# Patient Record
Sex: MALE | Race: WHITE | Employment: OTHER | URBAN - METROPOLITAN AREA
[De-identification: names, ages, dates, MRNs, and addresses within clinical notes are randomized per-mention and may not be internally consistent; named-entity substitution may affect disease eponyms.]

---

## 2021-06-02 ENCOUNTER — OFFICE VISIT (OUTPATIENT)
Dept: PSYCHIATRY | Facility: CLINIC | Age: 71
End: 2021-06-02

## 2021-06-02 DIAGNOSIS — G25.81 RESTLESS LEGS SYNDROME: ICD-10-CM

## 2021-06-02 DIAGNOSIS — M54.42 BILATERAL LOW BACK PAIN WITH LEFT-SIDED SCIATICA: Primary | ICD-10-CM

## 2021-06-02 NOTE — PROGRESS NOTES
Mr. Griffith, is a 71 year old male is here today for initial acupuncture treatment for main complaint of Back Pain     Here today with complaints of low back pain. Self-referred. Tried acupuncture once about 50 years back for shoulder pain caused by work as a , and it was helpful, so thought it would be good to try, given the insurance coverage and the fact that he's trying to reduce intake of pain medications.     Quality of pain: dull, achy. At times radiates down left leg.  Timing: constant. Manages with daily stretching/yoga in the morning x 45 minutes. While it doesn't completely reduce the pain, if he doesn't do this, he will notice the pain is much worse.     Pain medications used:  Aleve - 2 tabs, every day x 3-5 years. Somewhat helpful, but not completely. Doctor didn't want him to continue taking, so was supportive of his trial of CBD tincture, which has also proved to be helpful.     CBD Tincture - helpful. Wishes  It did more at current dose (70mg/day), but is hesitant to increase dose due to cost. Has start augmenting this with daily Tylenol, which his doctor recommended.       Patient is referred by: Self      Secondary Complaints: Restless less syndrome, essential tremor. Is told tremor is familial. Initially noted in right hand only, now seeing mild tremor in left hand.     Observation: Pulse: tight  Tongue: dusky pink with slight dry white coat, Mild sore noted on right side of tongue.   General: Well appearing, well nourished, in no distress. Oriented X3, intact recent and remote memory, judgment and insight, normal mood and affect.   Skin: Good turgor, no rash, unusual bruising or prominent lesions  Nails: Normal color, no deformities  Head: Normocephalic, atraumatic, no visible or palpable masses, depressions, or scarring. No hair noted.   Extremities: No amputations or deformities  Musculoskeletal: Normal gait and station. Ambulating without difficulty.     Eastern Medical Diagnosis  Pertinent to Treatment:  Qi Stagnation, Blood Stagnation, Kidney Qi Deficiency, Liver Qi Stagnation and Damp and Phlegm Accumulation      Treatment Principle: Unblock channels and move qi and blood to reduce back pain. Nourish kidney qi to support strength in channels and back flexibility.    Points Used Today:    Initial Point Insertions: GV20, Front zone Dingjie x 2  Number of needles inserted: 3     Additional Point Insertions: Ear: shenmen, LI4, TB5 (right), SI3 (left), BL62 (right), GB41 (left).  Number of needles inserted: 8    Needles stimulated,retained and removed.    Accessory Technique's:   TDP Heat Lamp: On Feet   Essential Oil(s): None    Acupuncture Treatment Recommendations and Comments: Weekly acupuncture for 4-8 weeks to assess for benefit for low back pain.        Past Medical History:   has no past medical history on file.      Energy: Medium  Sleep: restless legs  Emotions: No  Limbs/Back: Refer to Notes  Head/Eyes/Ears: NA  Digestion: no current symptoms  Stools: No Current Symptoms  Palpitation: NA  Women's Health: NA      Plan: It is my recommendation that patient seek advice from their PCP about active symptoms not addressed during this visit. Addressed all patient's questions to their satisfaction and was given consent to treat.      Patient understands that acupuncture is not a guarantee of benefits and will verify with own insurance and/or nav specialists. The risks and benefits of acupuncture were reviewed and the patient stated understanding. Answered all patient's questions to their satisfaction. Scope of practice and the acupuncturist's qualifications were also reviewed the patient provided signed consent.       Time Spent with Patient:       I spent a total of 60 minutes face-to-face with Dada Griffith during today's office visit. Total time from needle insertion to removal was 40 minutes.  Over 50% of this time was spent counseling the patient and/or coordinating care  regarding   Chief Complaint   Patient presents with     Back Pain    .  See note for details.    Cornelius Davis ND, LAc

## 2021-06-08 ENCOUNTER — OFFICE VISIT (OUTPATIENT)
Dept: BEHAVIORAL HEALTH | Facility: CLINIC | Age: 71
End: 2021-06-08

## 2021-06-08 DIAGNOSIS — M54.42 BILATERAL LOW BACK PAIN WITH LEFT-SIDED SCIATICA: Primary | ICD-10-CM

## 2021-06-09 ENCOUNTER — TELEPHONE (OUTPATIENT)
Dept: BEHAVIORAL HEALTH | Facility: CLINIC | Age: 71
End: 2021-06-09

## 2021-06-09 NOTE — TELEPHONE ENCOUNTER
LVM-informed patient that the only date and time available for acupuncture at [facility] was on July 6th at 9:30 am with Cornelius Davis. I also informed the patient to call us back if he was interested in being seen here.

## 2021-06-09 NOTE — PROGRESS NOTES
Mr. Griffith, is a 71 year old male is here today for a Follow Up acupuncture treatment for main complaint of Back Pain       Patient is referred by: Self    Report of Findings from Previous Treatment: Initial treatment felt very relaxing and calming. Noted some reduction in pain/discomfort for a few days after treatment.        Observation: Pulse: tight  Tongue: pale pink with slight white coat. Slightly puffy with slight scallops.  General: Well appearing, well nourished, in no distress. Oriented X3, intact recent and remote memory, judgment and insight, normal mood and affect.   Skin: Good turgor, no rash, unusual bruising or prominent lesions  Hair: Normal texture and distribution.  Nails: Normal color, no deformities  Head: Normocephalic, atraumatic.  Extremities: No amputations or deformities  Musculoskeletal: Normal gait and station. Ambulating without difficulty.       Eastern Medical Diagnosis Pertinent to Treatment:  Qi Deficiency, Qi Stagnation, Blood Stagnation and Kidney Qi Deficiency    Treatment Principle: Unblock channels and move qi and blood to reduce back pain. Nourish kidney qi to strengthen channel - back, legs and ankles.     Points Used Today:    Initial Point Insertions: GV20, Front zone Dingjie x 2  Number of needles inserted: 3     Additional Point Insertions: BL40, KD3, BL57, BL62, SI3 (right)  Number of needles inserted: 9     Additional Point Insertions: BL13, BL14, BL15, BL17, BL20, BL22, BL23, BL25, BL26, GB21  Number of needles inserted: 20      Needles stimulated,retained and removed.  Total number of needles removed and placed in sharps container: 32    Accessory Technique's:   TDP Heat Lamp: On Back   Essential Oil(s): None    Acupuncture Treatment Recommendations and Comments: Continue weekly acupuncture for 12 sessions and re-evaluate for benefit to manage low back pain.       Past Medical History:   has no past medical history on file.      Energy: Medium  Sleep: No  concerns  Emotions: No  Limbs/Back: Refer to Notes  Head/Eyes/Ears: NA  Digestion: no current symptoms  Stools: No Current Symptoms  Palpitation: NA  Women's Health: NA    Plan: It is my recommendation that patient seek advice from their PCP about active symptoms not addressed during this visit. Addressed all patient's questions to their satisfaction and was given consent to treat.      Patient understands that acupuncture is not a guarantee of benefits and will verify with own insurance and/or nav specialists. The risks and benefits of acupuncture were reviewed and the patient stated understanding. Answered all patient's questions to their satisfaction. Scope of practice and the acupuncturist's qualifications were also reviewed the patient provided signed consent.       Time Spent with Patient:        I spent a total of 60 minutes face-to-face with Dada Griffith during today's office visit. Total time from needle insertion to removal was 40 minutes. Over 50% of this time was spent counseling the patient and/or coordinating care regarding   Chief Complaint   Patient presents with     Back Pain   .  See note for details.            Cornelius Davis ND, LAc

## 2021-07-06 ENCOUNTER — OFFICE VISIT (OUTPATIENT)
Dept: BEHAVIORAL HEALTH | Facility: CLINIC | Age: 71
End: 2021-07-06

## 2021-07-06 DIAGNOSIS — G89.29 CHRONIC BILATERAL LOW BACK PAIN WITH LEFT-SIDED SCIATICA: Primary | ICD-10-CM

## 2021-07-06 DIAGNOSIS — M54.42 CHRONIC BILATERAL LOW BACK PAIN WITH LEFT-SIDED SCIATICA: Primary | ICD-10-CM

## 2021-07-06 NOTE — PROGRESS NOTES
"Mr. Griffith, is a 71 year old male is here today for a Follow Up acupuncture treatment for main complaint of Back Pain       Patient is referred by: Self    Report of Findings from Previous Treatment: Has been a few weeks since last appointment. Could \"feel\" that hadn't had an appointment. Does a lot to care for his body and ensure it feels good (daily stretching, walking, etc), but the acupuncture ha provided an added benefit for pain relief.    Observation:   General: Well appearing, well nourished, in no distress. Oriented X3, intact recent and remote memory, judgment and insight, normal mood and affect.   Skin: Good turgor, no rash, unusual bruising or prominent lesions  Hair: Normal texture and distribution.  Nails: Normal color, no deformities  Head: Normocephalic, atraumatic, no visible or palpable masses, depressions, or scarring.   Extremities: No amputations.  Musculoskeletal: Normal gait and station. Ambulating without difficulty.      Eastern Medical Diagnosis Pertinent to Treatment:  Qi Stagnation, Blood Stagnation and Kidney Qi Deficiency    Treatment Principle: Unblock channels and move qi and blood to reduce low back pain.     Points Used Today:    Initial Point Insertions: GV20, Front zone Dingjie x 2, GV24, ST8 (motor-neuron zone)  Number of needles inserted: 6     Additional Point Insertions: 3NP each ear  Number of needles inserted: 6     Additional Point Insertions: SI3 (right), BL62 (left),  GB39, KD3, LR3, GB34  Number of needles inserted: 8    Needles stimulated,retained and removed.  Total number of needles removed and placed in sharps container: 20    Accessory Technique's:   TDP Heat Lamp: On Feet   Essential Oil(s): None    Acupuncture Treatment Recommendations and Comments: Continue weekly acupuncture to manage low back pain.        Past Medical History:   has no past medical history on file.      Energy: Medium  Sleep: No concerns  Emotions: No  Limbs/Back: Refer to " Notes  Head/Eyes/Ears: NA  Digestion: no current symptoms  Stools: No Current Symptoms  Palpitation: NA  Women's Health: NA    Plan: It is my recommendation that patient seek advice from their PCP about active symptoms not addressed during this visit. Addressed all patient's questions to their satisfaction and was given consent to treat.      Patient understands that acupuncture is not a guarantee of benefits and will verify with own insurance and/or nav specialists. The risks and benefits of acupuncture were reviewed and the patient stated understanding. Answered all patient's questions to their satisfaction. Scope of practice and the acupuncturist's qualifications were also reviewed the patient provided signed consent.       Time Spent with Patient:        I spent a total of 60 minutes face-to-face with Dada Griffith during today's office visit. Total time from needle insertion to removal was 40 minutes. Over 50% of this time was spent counseling the patient and/or coordinating care regarding   Chief Complaint   Patient presents with     Back Pain   .  See note for details.            Cornelius Davis ND, LAc

## 2021-07-19 ENCOUNTER — OFFICE VISIT (OUTPATIENT)
Dept: PSYCHIATRY | Facility: CLINIC | Age: 71
End: 2021-07-19

## 2021-07-19 DIAGNOSIS — M54.42 CHRONIC BILATERAL LOW BACK PAIN WITH LEFT-SIDED SCIATICA: Primary | ICD-10-CM

## 2021-07-19 DIAGNOSIS — R25.1 TREMOR: ICD-10-CM

## 2021-07-19 DIAGNOSIS — G89.29 CHRONIC BILATERAL LOW BACK PAIN WITH LEFT-SIDED SCIATICA: Primary | ICD-10-CM

## 2021-07-19 DIAGNOSIS — G25.81 RESTLESS LEGS SYNDROME: ICD-10-CM

## 2021-07-19 NOTE — PROGRESS NOTES
Mr. Griffith, is a 71 year old male is here today for a Follow Up acupuncture treatment for main complaint of Back Pain and Tremors       Patient is referred by: Self    Report of Findings from Previous Treatment: Doing well - has been keeping busy with various engagements and family gatherings.  Plans to vacation up [place] ([facility]) for a week after next visit.     Finds he notices decreased low back pain and decrease in tremors for about 3 days after acupuncture    Observation: General: Well appearing, well nourished, in no distress. Oriented X3, intact recent and remote memory, judgment and insight, normal mood and affect.   Skin: Good turgor, no rash, unusual bruising or prominent lesions  Hair: Normal texture and distribution.  Nails: Normal color, no deformities  Head: Normocephalic, atraumatic, no visible or palpable masses, depressions, or scarring.   Extremities: No amputations. Notable scars on right and left knees  Musculoskeletal: Normal gait and station. Ambulating without difficulty      Eastern Medical Diagnosis Pertinent to Treatment:  Qi Stagnation, Blood Stagnation and Kidney Qi Deficiency    Treatment Principle: Unblock channels and move qi and blood to reduce low back pain.     Points Used Today:    Initial Point Insertions: GV20, Front zone Dingjie x 2, GV24, ST8 (motor-neuron zone)  Number of needles inserted: 6                 Additional Point Insertions: 3NP each ear  Number of needles inserted: 6                 Additional Point Insertions: SI3 (right), BL62 (left), GB39, KD3, LR3, GB34  Number of needles inserted: 8     Needles stimulated,retained and removed.  Total number of needles removed and placed in sharps container: 20    Accessory Technique's:   TDP Heat Lamp: On Feet   Essential Oil(s): None    Acupuncture Treatment Recommendations and Comments: Continue weekly acupuncture to manage low back pain and tremors.       Past Medical History:   has no past medical history on  file.      Energy: Medium  Sleep: No concerns  Emotions: No  Limbs/Back: Refer to Notes  Head/Eyes/Ears: NA  Digestion: no current symptoms  Stools: No Current Symptoms  Palpitation: NA    Plan: It is my recommendation that patient seek advice from their PCP about active symptoms not addressed during this visit. Addressed all patient's questions to their satisfaction and was given consent to treat.      Patient understands that acupuncture is not a guarantee of benefits and will verify with own insurance and/or nav specialists. The risks and benefits of acupuncture were reviewed and the patient stated understanding. Answered all patient's questions to their satisfaction. Scope of practice and the acupuncturist's qualifications were also reviewed the patient provided signed consent.       Time Spent with Patient:        I spent a total of 60 minutes face-to-face with Dada Griffith during today's office visit. Total time from needle insertion to removal was 40 minutes. Over 50% of this time was spent counseling the patient and/or coordinating care regarding   Chief Complaint   Patient presents with     Back Pain     Tremors   .  See note for details.            Cornelius Davis ND, LAc

## 2021-07-26 ENCOUNTER — OFFICE VISIT (OUTPATIENT)
Dept: PSYCHIATRY | Facility: CLINIC | Age: 71
End: 2021-07-26

## 2021-07-26 DIAGNOSIS — G25.81 RESTLESS LEGS SYNDROME: ICD-10-CM

## 2021-07-26 DIAGNOSIS — M54.42 CHRONIC BILATERAL LOW BACK PAIN WITH LEFT-SIDED SCIATICA: Primary | ICD-10-CM

## 2021-07-26 DIAGNOSIS — R25.1 TREMOR: ICD-10-CM

## 2021-07-26 DIAGNOSIS — G89.29 CHRONIC BILATERAL LOW BACK PAIN WITH LEFT-SIDED SCIATICA: Primary | ICD-10-CM

## 2021-07-26 NOTE — PROGRESS NOTES
Mr. Griffith, is a 71 year old male is here today for a Follow Up acupuncture treatment for main complaint of Back Pain       Patient is referred by: Self    Report of Findings from Previous Treatment: Low back pain - denies flare of acute pain. Participating in ADLs and social gatherings with family without difficulty.     Secondary Complaints: Tremor - notices improvement (decrease) in tremor after acupuncture sessions. This lasts about 3 days.     Observation:   Pulse: soft and muffled.  General: Well appearing, well nourished, in no distress. Oriented X3, intact recent and remote memory, judgment and insight, normal mood and affect.   Skin: Good turgor, no rash, unusual bruising or prominent lesions  Hair: Normal texture and distribution.  Nails: Normal color, no deformities  Head: Normocephalic, atraumatic, no visible or palpable masses, depressions, or scarring.   Extremities: No amputations. Notable scars on right and left knees  Musculoskeletal: Normal gait and station. Ambulating without difficulty      Eastern Medical Diagnosis Pertinent to Treatment:  Qi Stagnation, Blood Stagnation and Kidney Qi Deficiency    Treatment Principle: Unblock channels and move qi and blood to reduce low back pain.     Points Used Today:               Initial Point Insertions: GV20, Front zone Dingjie x 2, GV24, ST8 (motor-neuron zone)  Number of needles inserted: 6                 Additional Point Insertions: 3NP each ear  Number of needles inserted: 6                 Additional Point Insertions: SI3 (right), BL62 (left), GB34, KD3, LR2, LR8. LI4 (left)  Number of needles inserted: 11     Needles stimulated,retained and removed.  Total number of needles removed and placed in sharps container: 25    Accessory Technique's:   TDP Heat Lamp: On Feet   Essential Oil(s): None    Acupuncture Treatment Recommendations and Comments: Continue weekly acupuncture to manage low back pain and tremors.       Past Medical History:   has no  past medical history on file.      Energy: Medium  Sleep: No concerns  Emotions: No  Limbs/Back: Refer to Notes  Head/Eyes/Ears: NA  Digestion: no current symptoms  Stools: No Current Symptoms  Palpitation: NA  Women's Health: NA    Plan: It is my recommendation that patient seek advice from their PCP about active symptoms not addressed during this visit. Addressed all patient's questions to their satisfaction and was given consent to treat.      Patient understands that acupuncture is not a guarantee of benefits and will verify with own insurance and/or nav specialists. The risks and benefits of acupuncture were reviewed and the patient stated understanding. Answered all patient's questions to their satisfaction. Scope of practice and the acupuncturist's qualifications were also reviewed the patient provided signed consent.       Time Spent with Patient:        I spent a total of 45 minutes face-to-face with Dada Griffith during today's office visit. Total time from needle insertion to removal was 35 minutes. Over 50% of this time was spent counseling the patient and/or coordinating care regarding   Chief Complaint   Patient presents with     Back Pain   .  See note for details.            Cornelius Davis ND, LAc

## 2021-08-09 ENCOUNTER — OFFICE VISIT (OUTPATIENT)
Dept: PSYCHIATRY | Facility: CLINIC | Age: 71
End: 2021-08-09

## 2021-08-09 DIAGNOSIS — R25.1 TREMOR: ICD-10-CM

## 2021-08-09 DIAGNOSIS — G89.29 CHRONIC BILATERAL LOW BACK PAIN WITH LEFT-SIDED SCIATICA: Primary | ICD-10-CM

## 2021-08-09 DIAGNOSIS — M54.42 CHRONIC BILATERAL LOW BACK PAIN WITH LEFT-SIDED SCIATICA: Primary | ICD-10-CM

## 2021-08-09 DIAGNOSIS — G25.81 RESTLESS LEGS SYNDROME: ICD-10-CM

## 2021-08-09 NOTE — PROGRESS NOTES
Mr. Griffith, is a 71 year old male is here today for a Follow Up acupuncture treatment for main complaint of Back Pain and Tremors       Patient is referred by: Self    Report of Findings from Previous Treatment: Low back pain - denies flare of acute pain. Participating in ADLs and social gatherings with family without difficulty.  [place]stack wood and enjoy time with reduced pain/stiffness in low back and other joints. Not sure if it's related to normal temps (since it was not too hot, wasn't in AC, and body tends to do better with warmth).      Secondary Complaints: Tremor - notices improvement (decrease) in tremor after acupuncture sessions. This lasts about 3 days.     Observation: Pulse: soft and muffled.  General: Well appearing, well nourished, in no distress. Oriented X3, intact recent and remote memory, judgment and insight, normal mood and affect.   Skin: Good turgor, no rash, unusual bruising or prominent lesions  Hair: Normal texture and distribution.  Nails: Normal color, no deformities  Head: Normocephalic, atraumatic, no visible or palpable masses, depressions, or scarring.   Extremities: No amputations. Notable scars on right and left knees  Musculoskeletal: Normal gait and station. Ambulating without difficulty      Eastern Medical Diagnosis Pertinent to Treatment:  Qi Stagnation, Blood Stagnation and Kidney Qi Deficiency     Treatment Principle: Unblock channels and move qi and blood to reduce low back pain.     Points Used Today:               Initial Point Insertions: GV20, Front zone Dingjie x 2, GV24, ST8 (motor-neuron zone)  Number of needles inserted: 6                 Additional Point Insertions: Shenmen, Kidney each ear  Number of needles inserted: 4                 Additional Point Insertions: SI3 (right), BL62 (left), GB34, GB31, KD3, LR2, KD10, TB5 (left), GB41 (right)  Number of needles inserted: 14     Needles stimulated,retained and  removed.  Total number of needles removed and placed in sharps container: 24     Accessory Technique's:              TDP Heat Lamp: On Knees              Essential Oil(s): None    Acupuncture Treatment Recommendations and Comments: Continue weekly acupuncture to manage low back pain and tremors.       Past Medical History:   has no past medical history on file.      Energy: Medium  Sleep: No concerns  Emotions: No  Limbs/Back: Refer to Notes  Head/Eyes/Ears: NA  Digestion: no current symptoms  Stools: No Current Symptoms  Palpitation: NA    Plan: It is my recommendation that patient seek advice from their PCP about active symptoms not addressed during this visit. Addressed all patient's questions to their satisfaction and was given consent to treat.      Patient understands that acupuncture is not a guarantee of benefits and will verify with own insurance and/or nav specialists. The risks and benefits of acupuncture were reviewed and the patient stated understanding. Answered all patient's questions to their satisfaction. Scope of practice and the acupuncturist's qualifications were also reviewed the patient provided signed consent.       Time Spent with Patient:        I spent a total of 60 minutes face-to-face with Dada Griffith during today's office visit. Total time from needle insertion to removal was 40 minutes. Over 50% of this time was spent counseling the patient and/or coordinating care regarding   Chief Complaint   Patient presents with     Back Pain     Tremors   .  See note for details.            Cornelius Davis ND, Amanda

## 2021-08-16 ENCOUNTER — OFFICE VISIT (OUTPATIENT)
Dept: PSYCHIATRY | Facility: CLINIC | Age: 71
End: 2021-08-16

## 2021-08-16 DIAGNOSIS — G89.29 CHRONIC BILATERAL LOW BACK PAIN WITH LEFT-SIDED SCIATICA: Primary | ICD-10-CM

## 2021-08-16 DIAGNOSIS — M54.42 CHRONIC BILATERAL LOW BACK PAIN WITH LEFT-SIDED SCIATICA: Primary | ICD-10-CM

## 2021-08-16 DIAGNOSIS — R25.1 TREMOR: ICD-10-CM

## 2021-08-16 DIAGNOSIS — G25.81 RESTLESS LEGS SYNDROME: ICD-10-CM

## 2021-08-16 NOTE — PROGRESS NOTES
Mr. Griffith, is a 71 year old male is here today for a Follow Up acupuncture treatment for main complaint of Back Pain       Patient is referred by: Self    Report of Findings from Previous Treatment: Had slight flare in back pain this weekend  - thinks it was secondary to being in the car more, driving and sitting more over the weekend. Was able to prevent it from being worse by stretching and walking. Didn't disrupt sleep at all.     Feels that since starting acupuncture has had fewer flares of back pain and able to rebound more quickly.    Also  Noticing a decrease in tremors, both during and after acupuncture sessions.    Observation: Pulse: soft and muffled.  General: Well appearing, well nourished, in no distress. Oriented X3, intact recent and remote memory, judgment and insight, normal mood and affect.   Skin: Good turgor, no rash, unusual bruising or prominent lesions  Hair: Normal texture and distribution.  Nails: Normal color, no deformities  Head: Normocephalic, atraumatic, no visible or palpable masses, depressions, or scarring.   Extremities: No amputations. Notable scars on right and left knees  Musculoskeletal: Normal gait and station. Ambulating without difficulty      Eastern Medical Diagnosis Pertinent to Treatment:  Qi Stagnation, Blood Stagnation and Kidney Qi Deficiency     Treatment Principle: Unblock channels and move qi and blood to reduce low back pain.     Points Used Today:   Patient seated in recliner.              Initial Point Insertions: GV20, Front zone Dingjie x 2, GV24, ST8 (motor-neuron zone)  Number of needles inserted: 6                 Additional Point Insertions: 5NP each ear  Number of needles inserted: 10                 Additional Point Insertions: SI3 (right), BL62 (left), GB34, KD3, LR3, LR8  Number of needles inserted: 10     Needles stimulated,retained and removed.  Total number of needles removed and placed in sharps container: 26    Accessory  Technique's:              TDP Heat Lamp: On Knees              Essential Oil(s): None     Acupuncture Treatment Recommendations and Comments: Continue weekly acupuncture to manage low back pain and tremors.       Past Medical History:   has no past medical history on file.      Energy: Medium  Sleep: No concerns  Emotions: No  Limbs/Back: Refer to Notes  Head/Eyes/Ears: NA  Digestion: no current symptoms  Stools: No Current Symptoms  Palpitation: NA    Plan: It is my recommendation that patient seek advice from their PCP about active symptoms not addressed during this visit. Addressed all patient's questions to their satisfaction and was given consent to treat.      Patient understands that acupuncture is not a guarantee of benefits and will verify with own insurance and/or nav specialists. The risks and benefits of acupuncture were reviewed and the patient stated understanding. Answered all patient's questions to their satisfaction. Scope of practice and the acupuncturist's qualifications were also reviewed the patient provided signed consent.       Time Spent with Patient:        I spent a total of 60 minutes face-to-face with Dada Griffith during today's office visit. Total time from needle insertion to removal was 40 minutes. Over 50% of this time was spent counseling the patient and/or coordinating care regarding   Chief Complaint   Patient presents with     Back Pain   .  See note for details.            Cornelius Davis ND, LAc

## 2021-08-23 ENCOUNTER — OFFICE VISIT (OUTPATIENT)
Dept: PSYCHIATRY | Facility: CLINIC | Age: 71
End: 2021-08-23

## 2021-08-23 DIAGNOSIS — G89.29 CHRONIC BILATERAL LOW BACK PAIN WITH LEFT-SIDED SCIATICA: Primary | ICD-10-CM

## 2021-08-23 DIAGNOSIS — M54.42 CHRONIC BILATERAL LOW BACK PAIN WITH LEFT-SIDED SCIATICA: Primary | ICD-10-CM

## 2021-08-23 DIAGNOSIS — R25.1 TREMOR: ICD-10-CM

## 2021-08-23 NOTE — PROGRESS NOTES
Mr. Griffith, is a 71 year old male is here today for a Follow Up acupuncture treatment for main complaint of Back Pain and Tremors      Patient is referred by: Self    Report of Findings from Previous Treatment: Doing well - had an active weekend watching youth baseball and soccer games.    Feels that since starting acupuncture has had fewer flares of back pain and able to rebound more quickly.     Also noticing a decrease in tremors, both during and after acupuncture sessions.      Observation: Pulse: soft and muffled.  General: Well appearing, well nourished, in no distress. Oriented X3, intact recent and remote memory, judgment and insight, normal mood and affect.   Skin: Good turgor, no rash, unusual bruising or prominent lesions  Hair: Normal texture and distribution.  Nails: Normal color, no deformities  Head: Normocephalic, atraumatic, no visible or palpable masses, depressions, or scarring.   Extremities: No amputations. Notable scars on right and left knees  Musculoskeletal: Normal gait and station. Ambulating without difficulty      Eastern Medical Diagnosis Pertinent to Treatment:  Qi Stagnation, Blood Stagnation and Kidney Qi Deficiency     Treatment Principle: Unblock channels and move qi and blood to reduce low back pain.     Points Used Today:   Patient seated in recliner.              Initial Point Insertions: GV20, Front zone Dingjie x 2, GV24, ST8 (motor-neuron zone)  Number of needles inserted: 6                 Additional Point Insertions: 5NP each ear  Number of needles inserted: 10                 Additional Point Insertions: SI3 (right), BL62 (left), GB34, KD3, LR3, LR8  Number of needles inserted: 10     Needles stimulated,retained and removed.  Total number of needles removed and placed in sharps container: 26     Accessory Technique's:              TDP Heat Lamp: On Knees              Essential Oil(s): None    Acupuncture Treatment Recommendations and Comments: Continue weekly acupuncture to  manage low back pain and tremors.       Past Medical History:   has no past medical history on file.      Energy: Medium  Sleep: No concerns  Emotions: No  Limbs/Back: Refer to Notes  Head/Eyes/Ears: NA  Digestion: no current symptoms  Stools: No Current Symptoms  Palpitation: NA    Plan: It is my recommendation that patient seek advice from their PCP about active symptoms not addressed during this visit. Addressed all patient's questions to their satisfaction and was given consent to treat.      Patient understands that acupuncture is not a guarantee of benefits and will verify with own insurance and/or nav specialists. The risks and benefits of acupuncture were reviewed and the patient stated understanding. Answered all patient's questions to their satisfaction. Scope of practice and the acupuncturist's qualifications were also reviewed the patient provided signed consent.       Time Spent with Patient:        I spent a total of 60 minutes face-to-face with Dada Griffith during today's office visit. Total time from needle insertion to removal was 40 minutes. Over 50% of this time was spent counseling the patient and/or coordinating care regarding   Chief Complaint   Patient presents with     Back Pain     Tremors   .  See note for details.            Cornelius Davis ND, LAc

## 2021-08-30 ENCOUNTER — OFFICE VISIT (OUTPATIENT)
Dept: PSYCHIATRY | Facility: CLINIC | Age: 71
End: 2021-08-30

## 2021-08-30 DIAGNOSIS — R25.1 TREMOR: ICD-10-CM

## 2021-08-30 DIAGNOSIS — M54.42 CHRONIC BILATERAL LOW BACK PAIN WITH LEFT-SIDED SCIATICA: Primary | ICD-10-CM

## 2021-08-30 DIAGNOSIS — G89.29 CHRONIC BILATERAL LOW BACK PAIN WITH LEFT-SIDED SCIATICA: Primary | ICD-10-CM

## 2021-08-30 NOTE — PROGRESS NOTES
Mr. Griffith, is a 71 year old male is here today for a Follow Up acupuncture treatment for main complaint of Back Pain      Patient is referred by: Self    Report of Findings from Previous Treatment:   Denies flares of low back pain in the past week.     Feels that since starting acupuncture has had fewer flares of back pain and able to rebound more quickly.     Also  Noticing a decrease in tremors, both during and after acupuncture sessions.     Observation: Pulse: soft and muffled.  General: Well appearing, well nourished, in no distress. Oriented X3, intact recent and remote memory, judgment and insight, normal mood and affect.   Skin: Good turgor, no rash, unusual bruising or prominent lesions  Hair: Normal texture and distribution.  Nails: Normal color, no deformities  Head: Normocephalic, atraumatic, no visible or palpable masses, depressions, or scarring.   Extremities: No amputations. Notable scars on right and left knees  Musculoskeletal: Normal gait and station. Ambulating without difficulty        Eastern Medical Diagnosis Pertinent to Treatment:  Qi Stagnation, Blood Stagnation and Kidney Qi Deficiency     Treatment Principle: Unblock channels and move qi and blood to reduce low back pain.      Points Used Today:   Patient seated in recliner.              Initial Point Insertions: GV20, Front zone Dingjie x 2, GV24, ST8 (motor-neuron zone)  Number of needles inserted: 6                 Additional Point Insertions: 5NP right ear  Number of needles inserted: 5                 Additional Point Insertions: SI3 (right), BL62 (left), GB34, KD3, LR3, ST40  Number of needles inserted: 10     Needles stimulated,retained and removed.  Total number of needles removed and placed in sharps container: 21     Accessory Technique's:              TDP Heat Lamp: On Knees              Essential Oil(s): None     Acupuncture Treatment Recommendations and Comments: Continue weekly acupuncture to manage low back pain and  tremors.        Past Medical History:   has no past medical history on file.        Energy: Medium  Sleep: No concerns  Emotions: No  Limbs/Back: Refer to Notes  Head/Eyes/Ears: NA  Digestion: no current symptoms  Stools: No Current Symptoms  Palpitation: NA     Plan: It is my recommendation that patient seek advice from their PCP about active symptoms not addressed during this visit. Addressed all patient's questions to their satisfaction and was given consent to treat.        Patient understands that acupuncture is not a guarantee of benefits and will verify with own insurance and/or nav specialists. The risks and benefits of acupuncture were reviewed and the patient stated understanding. Answered all patient's questions to their satisfaction. Scope of practice and the acupuncturist's qualifications were also reviewed the patient provided signed consent.         Time Spent with Patient:        I spent a total of 60 minutes face-to-face with Dada Griffith during today's office visit. Total time from needle insertion to removal was 40 minutes.   Over 50% of this time was spent counseling the patient and/or coordinating care regarding   Chief Complaint   Patient presents with     Back Pain   .  See note for details.            Cornelius Davsi ND, LAc

## 2021-10-25 ENCOUNTER — OFFICE VISIT (OUTPATIENT)
Dept: PSYCHIATRY | Facility: CLINIC | Age: 71
End: 2021-10-25

## 2021-10-25 DIAGNOSIS — G25.81 RESTLESS LEGS SYNDROME: ICD-10-CM

## 2021-10-25 DIAGNOSIS — M54.42 CHRONIC BILATERAL LOW BACK PAIN WITH LEFT-SIDED SCIATICA: Primary | ICD-10-CM

## 2021-10-25 DIAGNOSIS — R25.1 TREMOR: ICD-10-CM

## 2021-10-25 DIAGNOSIS — G89.29 CHRONIC BILATERAL LOW BACK PAIN WITH LEFT-SIDED SCIATICA: Primary | ICD-10-CM

## 2021-11-24 ENCOUNTER — OFFICE VISIT (OUTPATIENT)
Dept: PSYCHIATRY | Facility: CLINIC | Age: 71
End: 2021-11-24

## 2021-11-24 DIAGNOSIS — G89.29 CHRONIC BILATERAL LOW BACK PAIN WITH LEFT-SIDED SCIATICA: Primary | ICD-10-CM

## 2021-11-24 DIAGNOSIS — R25.1 TREMOR: ICD-10-CM

## 2021-11-24 DIAGNOSIS — M54.42 CHRONIC BILATERAL LOW BACK PAIN WITH LEFT-SIDED SCIATICA: Primary | ICD-10-CM

## 2021-11-24 DIAGNOSIS — G25.81 RESTLESS LEGS SYNDROME: ICD-10-CM

## 2021-11-24 NOTE — PROGRESS NOTES
Mr. Griffith, is a 71 year old male is here today for a Follow Up acupuncture treatment for main complaint of Back Pain and Tremors      Patient is referred by: Self    Report of Findings from Previous Treatment: Denies flares of low back pain in past weeks. Notices less stiffness and pain if he stick to his routine - combination of regular acupuncture, daily morning stretches (including the two stretches we reviewed at last visit).     Feels that since starting acupuncture has had fewer flares of back pain and able to rebound more quickly.     Also noticing a decrease in tremors, both during and after acupuncture sessions.  Tremors have remained stable since last treatment. Had to do a lot of signing of documents the other day and noted increasing difficulty and change in signature. Denies micrographia, but letters/cursive becomes more rigid and jagged.      Observation: Pulse: soft and muffled.  General: Well appearing, well nourished, in no distress. Oriented X3, intact recent and remote memory, judgment and insight, normal mood and affect.   Skin: Good turgor, no rash, unusual bruising or prominent lesions  Hair: Normal texture and distribution.  Nails: Normal color, no deformities  Head: Normocephalic, atraumatic, no visible or palpable masses, depressions, or scarring.   Extremities: No amputations. Notable scars on right and left knees  Musculoskeletal: Normal gait and station. Ambulating without difficulty        Eastern Medical Diagnosis Pertinent to Treatment:  Qi Stagnation, Blood Stagnation and Kidney Qi Deficiency     Treatment Principle: Unblock channels and move qi and blood to reduce low back pain.      Points Used Today:   Patient seated in recliner.              Initial Point Insertions, including selecting, locating, and cleaning the points, cleaning my hands, inserting and manipulating the needles. Inserted at 11:35am and removed at 12:20pm: GV20, Front zone Dingjie x 2, GV24, 3NP each ear  Number  of needles inserted: 10                 Additional Point Insertions, including selecting, locating, and cleaning the points, cleaning my hands, inserting and manipulating the needles  - inserted at 11:50am and removed at 12:20pm: GB20, LI4, LR3, GB39, BL62, SP9, GB34, KD3  Number of needles inserted: 16     Needles stimulated,retained and removed.  Total number of needles removed and placed in sharps container: 26     Accessory Technique's:              TDP Heat Lamp: On feet              Essential Oil(s): None     Acupuncture Treatment Recommendations and Comments: Continue weekly acupuncture to manage low back pain and tremors.        Past Medical History:   has no past medical history on file.        Energy: Medium  Sleep: No concerns  Emotions: No  Limbs/Back: Refer to Notes  Head/Eyes/Ears: NA  Digestion: no current symptoms  Stools: No Current Symptoms  Palpitation: NA     Plan: It is my recommendation that patient seek advice from their PCP about active symptoms not addressed during this visit. Addressed all patient's questions to their satisfaction and was given consent to treat.        Patient understands that acupuncture is not a guarantee of benefits and will verify with own insurance and/or nav specialists. The risks and benefits of acupuncture were reviewed and the patient stated understanding. Answered all patient's questions to their satisfaction. Scope of practice and the acupuncturist's qualifications were also reviewed the patient provided signed consent.         Time Spent with Patient:        I spent a total of 35 minutes face-to-face with Dada Griffith during today's office visit. After the initial intake, time with patient was spent selecting, locating, and cleaning the points, cleaning my hands, inserting and manipulating the needles. 30 minutes spent performing the act of acupuncture with reinsertion of needles. Over 50% of this time was spent counseling the patient and/or  coordinating care regarding   Chief Complaint   Patient presents with     Back Pain     Tremors   .  See note for details.            Cornelius Davis ND, LAc

## 2021-12-01 ENCOUNTER — OFFICE VISIT (OUTPATIENT)
Dept: PSYCHIATRY | Facility: CLINIC | Age: 71
End: 2021-12-01

## 2021-12-01 DIAGNOSIS — M54.42 CHRONIC BILATERAL LOW BACK PAIN WITH LEFT-SIDED SCIATICA: Primary | ICD-10-CM

## 2021-12-01 DIAGNOSIS — G89.29 CHRONIC BILATERAL LOW BACK PAIN WITH LEFT-SIDED SCIATICA: Primary | ICD-10-CM

## 2021-12-01 DIAGNOSIS — R25.1 TREMOR: ICD-10-CM

## 2021-12-01 DIAGNOSIS — G25.81 RESTLESS LEGS SYNDROME: ICD-10-CM

## 2021-12-01 NOTE — PROGRESS NOTES
Mr. Griffith, is a 71 year old male is here today for a Follow Up acupuncture treatment for main complaint of Back Pain and Tremors       Patient is referred by: Self    Report of Findings from Previous Treatment: Recently played pickle ball and noted increased tightness of hamstring muscles and post-exertion pain. Made sure to stretch before, during and after activity. Noticing a flare of sciatica and back pain since doing this. Would like to continue playing pickle ball, but questioning whether he will be able to.  Pain: 5-6/10.   Managed with Ibuprofen: 400mg  Has been trying to manage pain with CBD, low dose turmeric and fish oil, but hasn't noticed the effect on pain with CBD that he expected, so is going to take a break from it.     Feels that since starting acupuncture has had fewer flares of back pain and able to rebound more quickly.     Also noticing a decrease in tremors, both during and after acupuncture sessions.  Tremors have remained stable since last treatment. Had to do a lot of signing of documents the other day and noted increasing difficulty and change in signature. Denies micrographia, but letters/cursive becomes more rigid and jagged.      Observation: Pulse: soft and muffled.  General: Well appearing, well nourished, in no distress. Oriented X3, intact recent and remote memory, judgment and insight, normal mood and affect.   Skin: Good turgor, no rash, unusual bruising or prominent lesions  Hair: Normal texture and distribution.  Nails: Normal color, no deformities  Head: Normocephalic, atraumatic, no visible or palpable masses, depressions, or scarring.   Extremities: No amputations. Notable scars on right and left knees  Musculoskeletal: Normal gait and station. Ambulating without difficulty        Eastern Medical Diagnosis Pertinent to Treatment:  Qi Stagnation, Blood Stagnation and Kidney Qi Deficiency     Treatment Principle: Unblock channels and move qi and blood to reduce low back  pain.      Points Used Today:               Initial Point Insertions, including selecting, locating, and cleaning the points, cleaning my hands, inserting and manipulating the needles. Inserted at 12:10pm and removed at 1:00pm: GV20, Front zone Dingjie x 2, GV24, ST8, 3NP each ear  Number of needles inserted: 12                 Additional Point Insertions, including selecting, locating, and cleaning the points, cleaning my hands, inserting and manipulating the needles  - inserted at 12:25pm and removed at 1:00pm: GB20, LI4, LR3, GB39, BL62 (right), SI3 (left), TB5 (right), GB41 (left), KD3  Number of needles inserted: 14     Needles stimulated,retained and removed.  Total number of needles removed and placed in sharps container: 26     Accessory Technique's:              TDP Heat Lamp: On feet              Essential Oil(s): None     Acupuncture Treatment Recommendations and Comments: Continue weekly acupuncture to manage low back pain and tremors.        Past Medical History:   has no past medical history on file.        Energy: Medium  Sleep: No concerns  Emotions: No  Limbs/Back: Refer to Notes  Head/Eyes/Ears: NA  Digestion: no current symptoms  Stools: No Current Symptoms  Palpitation: NA     Plan: It is my recommendation that patient seek advice from their PCP about active symptoms not addressed during this visit. Addressed all patient's questions to their satisfaction and was given consent to treat.        Patient understands that acupuncture is not a guarantee of benefits and will verify with own insurance and/or nav specialists. The risks and benefits of acupuncture were reviewed and the patient stated understanding. Answered all patient's questions to their satisfaction. Scope of practice and the acupuncturist's qualifications were also reviewed the patient provided signed consent.         Time Spent with Patient:        I spent a total of 35 minutes face-to-face with Dada Griffith during today's  office visit. After the initial intake, time with patient was spent selecting, locating, and cleaning the points, cleaning my hands, inserting and manipulating the needles. 30 minutes spent performing the act of acupuncture with reinsertion of needles.Over 50% of this time was spent counseling the patient and/or coordinating care regarding   Chief Complaint   Patient presents with     Back Pain     Tremors   .  See note for details.            Cornelius Davis ND, LAc

## 2021-12-28 NOTE — PROGRESS NOTES
Mr. Griffith, is a 71 year old male is here today for a Follow Up acupuncture treatment for main complaint of Back Pain and Tremors      Patient is referred by: Self    Report of Findings from Previous Treatment: Denies flares of low back pain in past weeks. Notices less stiffness and pain if he stick to his routine - combination of regular acupuncture, daily morning stretches.     Feels that since starting acupuncture has had fewer flares of back pain and able to rebound more quickly.     Also noticing a decrease in tremor, both during and after acupuncture sessions.  Tremors have increased somewhat since last visit - wonders if this is due to a longer break between acupuncture treatments.      Observation: Pulse: soft and muffled.  General: Well appearing, well nourished, in no distress. Oriented X3, intact recent and remote memory, judgment and insight, normal mood and affect.   Skin: Good turgor, no rash, unusual bruising or prominent lesions  Hair: Normal texture and distribution.  Nails: Normal color, no deformities  Head: Normocephalic, atraumatic, no visible or palpable masses, depressions, or scarring.   Extremities: No amputations. Notable scars on right and left knees  Musculoskeletal: Normal gait and station. Ambulating without difficulty        Eastern Medical Diagnosis Pertinent to Treatment:  Qi Stagnation, Blood Stagnation and Kidney Qi Deficiency     Treatment Principle: Unblock channels and move qi and blood to reduce low back pain.      Points Used Today:   Patient seated in recliner.              Initial Point Insertions, including selecting, locating, and cleaning the points, cleaning my hands, inserting and manipulating the needles. Inserted at 10:40am and removed at 11:20pm: GV20, Front zone Dingjie x 2, GV24, 3NP each ear  Number of needles inserted: 10                 Additional Point Insertions, including selecting, locating, and cleaning the points, cleaning my hands, inserting and  manipulating the needles  - inserted at 10:55am and removed at 11:20pm: GB20, LI4, LR3, GB39, BL62, SP9, GB34, KD3  Number of needles inserted: 16     Needles stimulated,retained and removed.  Total number of needles removed and placed in sharps container: 26     Accessory Technique's:              TDP Heat Lamp: On feet              Essential Oil(s): None     Acupuncture Treatment Recommendations and Comments: Continue acupuncture every 2-4 weeks to manage low back pain and tremors.        Past Medical History:   has no past medical history on file.        Energy: Medium  Sleep: No concerns  Emotions: No  Limbs/Back: Refer to Notes  Head/Eyes/Ears: NA  Digestion: no current symptoms  Stools: No Current Symptoms  Palpitation: NA     Plan: It is my recommendation that patient seek advice from their PCP about active symptoms not addressed during this visit. Addressed all patient's questions to their satisfaction and was given consent to treat.        Patient understands that acupuncture is not a guarantee of benefits and will verify with own insurance and/or nav specialists. The risks and benefits of acupuncture were reviewed and the patient stated understanding. Answered all patient's questions to their satisfaction. Scope of practice and the acupuncturist's qualifications were also reviewed the patient provided signed consent.         Time Spent with Patient:        I spent a total of 35 minutes face-to-face with Dada Griffith during today's office visit. After the initial intake, time with patient was spent selecting, locating, and cleaning the points, cleaning my hands, inserting and manipulating the needles. 30 minutes spent performing the act of acupuncture with reinsertion of needles.  Over 50% of this time was spent counseling the patient and/or coordinating care regarding   Chief Complaint   Patient presents with     Back Pain     Tremors   .  See note for details.            Cornelius Davis ND,  LAc

## 2022-01-05 ENCOUNTER — OFFICE VISIT (OUTPATIENT)
Dept: PSYCHIATRY | Facility: CLINIC | Age: 72
End: 2022-01-05
Payer: COMMERCIAL

## 2022-01-05 DIAGNOSIS — G89.29 CHRONIC BILATERAL LOW BACK PAIN WITH LEFT-SIDED SCIATICA: Primary | ICD-10-CM

## 2022-01-05 DIAGNOSIS — M54.42 CHRONIC BILATERAL LOW BACK PAIN WITH LEFT-SIDED SCIATICA: Primary | ICD-10-CM

## 2022-01-05 DIAGNOSIS — R25.1 TREMOR: ICD-10-CM

## 2022-01-05 DIAGNOSIS — G25.81 RESTLESS LEGS SYNDROME: ICD-10-CM

## 2022-01-05 NOTE — PROGRESS NOTES
Mr. Griffith, is a 71 year old male is here today for a Follow Up acupuncture treatment for main complaint of Back Pain and Tremors       Patient is referred by: Self    Report of Findings from Previous Treatment: Recent increase in back pain and tremors with stress of holidays: Found out wife will likely need surgery in the spring to remove two masses, and dog of 10 years  (put him down about 3 days ago). While understands he needed to move on, it's been tough since he's been a part of their family for so long.   Pain: 5-6/10.   Managed with Ibuprofen: 400mg      Observation: Pulse: soft and muffled.  General: Well appearing, well nourished, in no distress. Oriented X3, intact recent and remote memory, judgment and insight, normal mood and affect.   Skin: Good turgor, no rash, unusual bruising or prominent lesions  Hair: Normal texture and distribution.  Nails: Normal color, no deformities  Head: Normocephalic, atraumatic, no visible or palpable masses, depressions, or scarring.   Extremities: No amputations. Notable scars on right and left knees  Musculoskeletal: Normal gait and station. Ambulating without difficulty  Neuro: notable tremor, bilateral in upper extremity, at rest.        Eastern Medical Diagnosis Pertinent to Treatment:  Qi Stagnation, Blood Stagnation and Kidney Qi Deficiency     Treatment Principle: Unblock channels and move qi and blood to reduce low back pain.      Points Used Today:               Initial Point Insertions, including selecting, locating, and cleaning the points, cleaning my hands, inserting and manipulating the needles. Inserted at 12:10pm and removed at 12:50pm: GV20, Front zone Dingjie x 2, GV24, ST8, 3NP each ear  Number of needles inserted: 12                 Additional Point Insertions, including selecting, locating, and cleaning the points, cleaning my hands, inserting and manipulating the needles  - inserted at 12:25pm and removed at 12:50pm: LI4, LR3, BL62 (right), SI3  (left), TB5 (right), GB41 (left), KD3, HT7, LR2  Number of needles inserted: 14     Needles stimulated,retained and removed.  Total number of needles removed and placed in sharps container: 26     Accessory Technique's:              TDP Heat Lamp: On feet              Essential Oil(s): None     Acupuncture Treatment Recommendations and Comments: Continue weekly acupuncture to manage low back pain and tremors.        Past Medical History:   has no past medical history on file.        Energy: Medium  Sleep: No concerns  Emotions: No  Limbs/Back: Refer to Notes  Head/Eyes/Ears: NA  Digestion: no current symptoms  Stools: No Current Symptoms  Palpitation: NA     Plan: It is my recommendation that patient seek advice from their PCP about active symptoms not addressed during this visit. Addressed all patient's questions to their satisfaction and was given consent to treat.        Patient understands that acupuncture is not a guarantee of benefits and will verify with own insurance and/or nav specialists. The risks and benefits of acupuncture were reviewed and the patient stated understanding. Answered all patient's questions to their satisfaction. Scope of practice and the acupuncturist's qualifications were also reviewed the patient provided signed consent.         Time Spent with Patient:        I spent a total of 35 minutes face-to-face with Dada Griffith during today's office visit. After the initial intake, time with patient was spent selecting, locating, and cleaning the points, cleaning my hands, inserting and manipulating the needles. 30 minutes spent performing the act of acupuncture with reinsertion of needles. Over 50% of this time was spent counseling the patient and/or coordinating care regarding   Chief Complaint   Patient presents with     Back Pain     Tremors   .  See note for details.            Cornelius Davis ND, LAc

## 2022-01-28 ENCOUNTER — TRANSFERRED RECORDS (OUTPATIENT)
Dept: HEALTH INFORMATION MANAGEMENT | Facility: CLINIC | Age: 72
End: 2022-01-28

## 2022-02-02 ENCOUNTER — OFFICE VISIT (OUTPATIENT)
Dept: PSYCHIATRY | Facility: CLINIC | Age: 72
End: 2022-02-02
Payer: COMMERCIAL

## 2022-02-02 DIAGNOSIS — R25.1 TREMOR: ICD-10-CM

## 2022-02-02 DIAGNOSIS — G89.29 CHRONIC BILATERAL LOW BACK PAIN WITH LEFT-SIDED SCIATICA: Primary | ICD-10-CM

## 2022-02-02 DIAGNOSIS — G25.81 RESTLESS LEGS SYNDROME: ICD-10-CM

## 2022-02-02 DIAGNOSIS — M54.42 CHRONIC BILATERAL LOW BACK PAIN WITH LEFT-SIDED SCIATICA: Primary | ICD-10-CM

## 2022-02-02 NOTE — PROGRESS NOTES
Mr. Griffith, is a 71 year old male is here today for a Follow Up acupuncture treatment for main complaint of Back Pain and Tremors       Patient is referred by: Self    Report of Findings from Previous Treatment:   Back pain - off and on. Stressors with wife's health have impacted routine, but otherwise, sticking to routine and practices to stay flexible and minimize pain.   Pain: 5-6/10.   Managed with Ibuprofen: 400mg        Observation: Pulse: soft and muffled.  General: Well appearing, well nourished, in no distress. Oriented X3, intact recent and remote memory, judgment and insight, normal mood and affect.   Skin: Good turgor, no rash, unusual bruising or prominent lesions  Hair: Normal texture and distribution.  Nails: Normal color, no deformities  Head: Normocephalic, atraumatic, no visible or palpable masses, depressions, or scarring.   Extremities: No amputations. Notable scars on right and left knees  Musculoskeletal: Normal gait and station. Ambulating without difficulty  Neuro: notable tremor, bilateral in upper extremity, at rest.        [facility] Diagnosis Pertinent to Treatment:  Qi Stagnation, Blood Stagnation and Kidney Qi Deficiency     Treatment Principle: Unblock channels and move qi and blood to reduce low back pain.      Points Used Today:               Initial Point Insertions, including selecting, locating, and cleaning the points, cleaning my hands, inserting and manipulating the needles. Inserted at 12:40pm and removed at 1:20pm: GV20, Front zone [place] x 2, GV24, ST8, 3NP each ear  Number of needles inserted: 12                 Additional Point Insertions, including selecting, locating, and cleaning the points, cleaning my hands, inserting and manipulating the needles  - inserted at 12:55pm and removed at 1:20pm: LI4, LR3, BL62 (right), SI3 (left), TB5 (right), GB41 (left), KD3, HT7, LR2  Number of needles inserted: 14     Needles stimulated,retained and removed.  Total number of  needles removed and placed in sharps container: 26     Accessory Technique's:              TDP Heat Lamp: On feet              Essential Oil(s): None     Acupuncture Treatment Recommendations and Comments: Continue weekly acupuncture to manage low back pain and tremors.        Past Medical History:   has no past medical history on file.        Energy: Medium  Sleep: No concerns  Emotions: No  Limbs/Back: Refer to Notes  Head/Eyes/Ears: NA  Digestion: no current symptoms  Stools: No Current Symptoms  Palpitation: NA     Plan: It is my recommendation that patient seek advice from their PCP about active symptoms not addressed during this visit. Addressed all patient's questions to their satisfaction and was given consent to treat.        Patient understands that acupuncture is not a guarantee of benefits and will verify with own insurance and/or nav specialists. The risks and benefits of acupuncture were reviewed and the patient stated understanding. Answered all patient's questions to their satisfaction. Scope of practice and the acupuncturist's qualifications were also reviewed the patient provided signed consent.         Time Spent with Patient:        I spent a total of 35 minutes face-to-face with Dada Griffith during today's office visit. After the initial intake, time with patient was spent selecting, locating, and cleaning the points, cleaning my hands, inserting and manipulating the needles. 30 minutes spent performing the act of acupuncture with reinsertion of needles. Over 50% of this time was spent counseling the patient and/or coordinating care regarding   Chief Complaint   Patient presents with     Back Pain     Tremors   .  See note for details.            Cornelius Davis ND, LAc

## 2022-03-02 ENCOUNTER — TELEPHONE (OUTPATIENT)
Dept: NEUROLOGY | Facility: CLINIC | Age: 72
End: 2022-03-02
Payer: COMMERCIAL